# Patient Record
Sex: MALE | Race: WHITE | NOT HISPANIC OR LATINO | Employment: FULL TIME | URBAN - NONMETROPOLITAN AREA
[De-identification: names, ages, dates, MRNs, and addresses within clinical notes are randomized per-mention and may not be internally consistent; named-entity substitution may affect disease eponyms.]

---

## 2021-05-30 ENCOUNTER — OFFICE VISIT (OUTPATIENT)
Dept: FAMILY MEDICINE | Facility: CLINIC | Age: 41
End: 2021-05-30

## 2021-05-30 VITALS
HEIGHT: 73 IN | WEIGHT: 228 LBS | DIASTOLIC BLOOD PRESSURE: 90 MMHG | BODY MASS INDEX: 30.22 KG/M2 | SYSTOLIC BLOOD PRESSURE: 126 MMHG | OXYGEN SATURATION: 97 % | TEMPERATURE: 98 F | HEART RATE: 84 BPM

## 2021-05-30 DIAGNOSIS — J32.9 SINUSITIS, UNSPECIFIED CHRONICITY, UNSPECIFIED LOCATION: Primary | ICD-10-CM

## 2021-05-30 PROCEDURE — 96372 THER/PROPH/DIAG INJ SC/IM: CPT | Mod: ,,, | Performed by: NURSE PRACTITIONER

## 2021-05-30 PROCEDURE — 99051 PR MEDICAL SERVICES, EVE/WKEND/HOLIDAY: ICD-10-PCS | Mod: ,,, | Performed by: NURSE PRACTITIONER

## 2021-05-30 PROCEDURE — 99203 OFFICE O/P NEW LOW 30 MIN: CPT | Mod: 25,,, | Performed by: NURSE PRACTITIONER

## 2021-05-30 PROCEDURE — 99203 PR OFFICE/OUTPT VISIT, NEW, LEVL III, 30-44 MIN: ICD-10-PCS | Mod: 25,,, | Performed by: NURSE PRACTITIONER

## 2021-05-30 PROCEDURE — 99051 MED SERV EVE/WKEND/HOLIDAY: CPT | Mod: ,,, | Performed by: NURSE PRACTITIONER

## 2021-05-30 PROCEDURE — 96372 PR INJECTION,THERAP/PROPH/DIAG2ST, IM OR SUBCUT: ICD-10-PCS | Mod: ,,, | Performed by: NURSE PRACTITIONER

## 2021-05-30 RX ORDER — METHYLPREDNISOLONE ACETATE 40 MG/ML
40 INJECTION, SUSPENSION INTRA-ARTICULAR; INTRALESIONAL; INTRAMUSCULAR; SOFT TISSUE ONCE
Status: COMPLETED | OUTPATIENT
Start: 2021-05-30 | End: 2021-05-30

## 2021-05-30 RX ORDER — AMOXICILLIN AND CLAVULANATE POTASSIUM 875; 125 MG/1; MG/1
1 TABLET, FILM COATED ORAL 2 TIMES DAILY
Qty: 14 TABLET | Refills: 0 | Status: SHIPPED | OUTPATIENT
Start: 2021-05-30 | End: 2021-06-06

## 2021-05-30 RX ORDER — DEXAMETHASONE SODIUM PHOSPHATE 4 MG/ML
4 INJECTION, SOLUTION INTRA-ARTICULAR; INTRALESIONAL; INTRAMUSCULAR; INTRAVENOUS; SOFT TISSUE
Status: COMPLETED | OUTPATIENT
Start: 2021-05-30 | End: 2021-05-30

## 2021-05-30 RX ADMIN — METHYLPREDNISOLONE ACETATE 40 MG: 40 INJECTION, SUSPENSION INTRA-ARTICULAR; INTRALESIONAL; INTRAMUSCULAR; SOFT TISSUE at 10:05

## 2021-05-30 RX ADMIN — DEXAMETHASONE SODIUM PHOSPHATE 4 MG: 4 INJECTION, SOLUTION INTRA-ARTICULAR; INTRALESIONAL; INTRAMUSCULAR; INTRAVENOUS; SOFT TISSUE at 10:05

## 2021-11-30 ENCOUNTER — OFFICE VISIT (OUTPATIENT)
Dept: PRIMARY CARE CLINIC | Facility: CLINIC | Age: 41
End: 2021-11-30

## 2021-11-30 VITALS
RESPIRATION RATE: 17 BRPM | HEART RATE: 74 BPM | DIASTOLIC BLOOD PRESSURE: 88 MMHG | WEIGHT: 239 LBS | TEMPERATURE: 98 F | BODY MASS INDEX: 31.68 KG/M2 | OXYGEN SATURATION: 99 % | HEIGHT: 73 IN | SYSTOLIC BLOOD PRESSURE: 136 MMHG

## 2021-11-30 DIAGNOSIS — H66.001 NON-RECURRENT ACUTE SUPPURATIVE OTITIS MEDIA OF RIGHT EAR WITHOUT SPONTANEOUS RUPTURE OF TYMPANIC MEMBRANE: Primary | ICD-10-CM

## 2021-11-30 DIAGNOSIS — J06.9 ACUTE UPPER RESPIRATORY INFECTION: ICD-10-CM

## 2021-11-30 PROCEDURE — 96372 PR INJECTION,THERAP/PROPH/DIAG2ST, IM OR SUBCUT: ICD-10-PCS | Mod: ,,, | Performed by: NURSE PRACTITIONER

## 2021-11-30 PROCEDURE — 99203 OFFICE O/P NEW LOW 30 MIN: CPT | Mod: 25,,, | Performed by: NURSE PRACTITIONER

## 2021-11-30 PROCEDURE — 99203 PR OFFICE/OUTPT VISIT, NEW, LEVL III, 30-44 MIN: ICD-10-PCS | Mod: 25,,, | Performed by: NURSE PRACTITIONER

## 2021-11-30 PROCEDURE — 96372 THER/PROPH/DIAG INJ SC/IM: CPT | Mod: ,,, | Performed by: NURSE PRACTITIONER

## 2021-11-30 RX ORDER — CLONAZEPAM 0.5 MG/1
TABLET ORAL
COMMUNITY
Start: 2021-11-18

## 2021-11-30 RX ORDER — AMOXICILLIN AND CLAVULANATE POTASSIUM 875; 125 MG/1; MG/1
1 TABLET, FILM COATED ORAL EVERY 12 HOURS
Qty: 20 TABLET | Refills: 0 | Status: SHIPPED | OUTPATIENT
Start: 2021-11-30 | End: 2023-01-22

## 2021-11-30 RX ORDER — METHYLPREDNISOLONE ACETATE 40 MG/ML
40 INJECTION, SUSPENSION INTRA-ARTICULAR; INTRALESIONAL; INTRAMUSCULAR; SOFT TISSUE
Status: COMPLETED | OUTPATIENT
Start: 2021-11-30 | End: 2021-11-30

## 2021-11-30 RX ORDER — DEXAMETHASONE SODIUM PHOSPHATE 4 MG/ML
6 INJECTION, SOLUTION INTRA-ARTICULAR; INTRALESIONAL; INTRAMUSCULAR; INTRAVENOUS; SOFT TISSUE
Status: COMPLETED | OUTPATIENT
Start: 2021-11-30 | End: 2021-11-30

## 2021-11-30 RX ORDER — CEFTRIAXONE 1 G/1
1 INJECTION, POWDER, FOR SOLUTION INTRAMUSCULAR; INTRAVENOUS
Status: COMPLETED | OUTPATIENT
Start: 2021-11-30 | End: 2021-11-30

## 2021-11-30 RX ADMIN — CEFTRIAXONE 1 G: 1 INJECTION, POWDER, FOR SOLUTION INTRAMUSCULAR; INTRAVENOUS at 09:11

## 2021-11-30 RX ADMIN — METHYLPREDNISOLONE ACETATE 40 MG: 40 INJECTION, SUSPENSION INTRA-ARTICULAR; INTRALESIONAL; INTRAMUSCULAR; SOFT TISSUE at 10:11

## 2021-11-30 RX ADMIN — DEXAMETHASONE SODIUM PHOSPHATE 6 MG: 4 INJECTION, SOLUTION INTRA-ARTICULAR; INTRALESIONAL; INTRAMUSCULAR; INTRAVENOUS; SOFT TISSUE at 10:11

## 2023-01-22 ENCOUNTER — OFFICE VISIT (OUTPATIENT)
Dept: FAMILY MEDICINE | Facility: CLINIC | Age: 43
End: 2023-01-22

## 2023-01-22 VITALS
DIASTOLIC BLOOD PRESSURE: 86 MMHG | TEMPERATURE: 102 F | OXYGEN SATURATION: 99 % | SYSTOLIC BLOOD PRESSURE: 120 MMHG | WEIGHT: 216 LBS | HEIGHT: 73 IN | BODY MASS INDEX: 28.63 KG/M2 | HEART RATE: 125 BPM

## 2023-01-22 DIAGNOSIS — Z20.822 CONTACT WITH AND (SUSPECTED) EXPOSURE TO COVID-19: ICD-10-CM

## 2023-01-22 DIAGNOSIS — J02.9 SORE THROAT: ICD-10-CM

## 2023-01-22 DIAGNOSIS — R50.9 FEVER IN ADULT: ICD-10-CM

## 2023-01-22 DIAGNOSIS — B95.0 STREPTOCOCCAL INFECTION GROUP A: Primary | ICD-10-CM

## 2023-01-22 LAB
CTP QC/QA: YES
CTP QC/QA: YES
FLUAV AG NPH QL: NEGATIVE
FLUBV AG NPH QL: NEGATIVE
S PYO RRNA THROAT QL PROBE: POSITIVE
SARS-COV-2 AG RESP QL IA.RAPID: NEGATIVE

## 2023-01-22 PROCEDURE — 99213 OFFICE O/P EST LOW 20 MIN: CPT | Mod: 25,,, | Performed by: NURSE PRACTITIONER

## 2023-01-22 PROCEDURE — 87428 POCT SARS-COV2 (COVID) WITH FLU ANTIGEN: ICD-10-PCS | Mod: QW,,, | Performed by: NURSE PRACTITIONER

## 2023-01-22 PROCEDURE — 96372 PR INJECTION,THERAP/PROPH/DIAG2ST, IM OR SUBCUT: ICD-10-PCS | Mod: ,,, | Performed by: NURSE PRACTITIONER

## 2023-01-22 PROCEDURE — 87428 SARSCOV & INF VIR A&B AG IA: CPT | Mod: QW,,, | Performed by: NURSE PRACTITIONER

## 2023-01-22 PROCEDURE — 99213 PR OFFICE/OUTPT VISIT, EST, LEVL III, 20-29 MIN: ICD-10-PCS | Mod: 25,,, | Performed by: NURSE PRACTITIONER

## 2023-01-22 PROCEDURE — 96372 THER/PROPH/DIAG INJ SC/IM: CPT | Mod: ,,, | Performed by: NURSE PRACTITIONER

## 2023-01-22 PROCEDURE — 99051 MED SERV EVE/WKEND/HOLIDAY: CPT | Mod: ,,, | Performed by: NURSE PRACTITIONER

## 2023-01-22 PROCEDURE — 87880 POCT RAPID STREP A: ICD-10-PCS | Mod: QW,,, | Performed by: NURSE PRACTITIONER

## 2023-01-22 PROCEDURE — 99051 PR MEDICAL SERVICES, EVE/WKEND/HOLIDAY: ICD-10-PCS | Mod: ,,, | Performed by: NURSE PRACTITIONER

## 2023-01-22 PROCEDURE — 87880 STREP A ASSAY W/OPTIC: CPT | Mod: QW,,, | Performed by: NURSE PRACTITIONER

## 2023-01-22 RX ORDER — ACETAMINOPHEN 325 MG/1
650 TABLET ORAL
Status: COMPLETED | OUTPATIENT
Start: 2023-01-22 | End: 2023-01-22

## 2023-01-22 RX ORDER — BUPROPION HYDROCHLORIDE 300 MG/1
300 TABLET ORAL EVERY MORNING
COMMUNITY
Start: 2022-10-11

## 2023-01-22 RX ORDER — DEXTROAMPHETAMINE SACCHARATE, AMPHETAMINE ASPARTATE, DEXTROAMPHETAMINE SULFATE AND AMPHETAMINE SULFATE 5; 5; 5; 5 MG/1; MG/1; MG/1; MG/1
0.5 TABLET ORAL 2 TIMES DAILY
COMMUNITY
Start: 2023-01-13

## 2023-01-22 RX ADMIN — ACETAMINOPHEN 650 MG: 325 TABLET ORAL at 11:01

## 2023-01-22 NOTE — LETTER
January 22, 2023    Parker Watson  [address]  [place] [address]             [facility] [address] [facility]  Family Medicine  [facility] [address] [facility] [place] [address]  Phone: [phone]  Fax: [phone]   January 22, 2023     Patient: Parker Watson   YOB: 1980   Date of Visit: 1/22/2023       To Whom it May Concern:    Parker Watson was seen in my clinic on 1/22/2023. He may return to work on 1/24/23 .    Please excuse him from any classes or work missed.    If you have any questions or concerns, please don't hesitate to call.    Sincerely,         LEI Galo

## 2023-01-22 NOTE — PROGRESS NOTES
[facility]    Chief Complaint      Chief Complaint   Patient presents with    Fever    Generalized Body Aches    Sore Throat    Cough    Headache    Fatigue    Chills    Nausea    Emesis    Documentation Only     Symptoms began yesterday        History of Present Illness      Parker Watson is a 42 y.o. male. He  has a past medical history of Allergy and Anxiety., who presents today for URI type symptoms since yesterday.    Past Medical History:  Past Medical History:   Diagnosis Date    Allergy     Anxiety        Past Surgical History:   has a past surgical history that includes Appendectomy.    Social History:  Social History     Tobacco Use    Smoking status: Some Days     Types: Cigarettes    Smokeless tobacco: Never    Tobacco comments:     was 1 pk a week; now smokes approx a pk over 1-2 months   Substance Use Topics    Alcohol use: Yes     Comment: social    Drug use: Never       I personally reviewed all past medical, surgical, and social.     Review of Systems   Constitutional:  Positive for chills, fatigue and fever.   HENT:  Positive for sore throat. Negative for rhinorrhea and sneezing.    Respiratory:  Positive for cough. Negative for shortness of breath.    Gastrointestinal:  Positive for nausea and vomiting. Negative for diarrhea.   Musculoskeletal:  Positive for myalgias.   Skin:  Negative for rash.   Neurological:  Positive for headaches.      Medications:  Outpatient Encounter Medications as of 1/22/2023   Medication Sig Dispense Refill    buPROPion (WELLBUTRIN XL) 300 MG 24 hr tablet Take 300 mg by mouth every morning.      dextroamphetamine-amphetamine (ADDERALL) 20 mg tablet Take 0.5 tablets by mouth 2 (two) times daily.      clonazePAM (KLONOPIN) 0.5 MG tablet TAKE 1 TABLET BY MOUTH TWICE DAILY AS NEEDED FOR ANXIETY (MAY TAKE 2 FOR EXTREME ANXIETY) CAUSES DROWSINESS-AVOID ALCOHOL!!      [DISCONTINUED] amoxicillin-clavulanate 875-125mg (AUGMENTIN) 875-125 mg per tablet Take 1 tablet  "by mouth every 12 (twelve) hours. (Patient not taking: Reported on 1/22/2023) 20 tablet 0     Facility-Administered Encounter Medications as of 1/22/2023   Medication Dose Route Frequency Provider Last Rate Last Admin    [COMPLETED] acetaminophen tablet 650 mg  650 mg Oral 1 time in Clinic/HOD LEI Galo   650 mg at 01/22/23 1148    [COMPLETED] penicillin G benzathine (BICILLIN LA) injection 1.2 Million Units  1.2 Million Units Intramuscular Once Jolly Zamarripa LEI   1.2 Million Units at 01/22/23 1309       Allergies:  Review of patient's allergies indicates:  No Known Allergies    Health Maintenance:  There is no immunization history for the selected administration types on file for this patient.   Health Maintenance   Topic Date Due    Hepatitis C Screening  Never done    Lipid Panel  Never done    TETANUS VACCINE  Never done        Physical Exam      Vital Signs  Temp: (!) 102.1 °F (38.9 °C)  Temp src: Oral  Pulse: (!) 125  SpO2: 99 %  BP: 120/86  Height and Weight  Height: 6' 1" (185.4 cm)  Weight: 98 kg (216 lb)  BSA (Calculated - sq m): 2.25 sq meters  BMI (Calculated): 28.5  Weight in (lb) to have BMI = 25: 189.1]    Physical Exam  Vitals and nursing note reviewed.   Constitutional:       General: He is not in acute distress.     Appearance: He is ill-appearing.   HENT:      Head: Normocephalic.      Right Ear: Hearing normal.      Left Ear: Hearing normal.      Nose: Nose normal.      Mouth/Throat:      Lips: Pink.      Mouth: Mucous membranes are dry.      Pharynx: Pharyngeal swelling and posterior oropharyngeal erythema present. No oropharyngeal exudate.   Eyes:      General: Lids are normal.      Conjunctiva/sclera: Conjunctivae normal.   Neck:      Thyroid: No thyromegaly.   Cardiovascular:      Rate and Rhythm: Regular rhythm. Tachycardia present.      Heart sounds: Normal heart sounds.   Pulmonary:      Effort: Pulmonary effort is normal.      Breath sounds: Normal breath sounds. "   Musculoskeletal:         General: Normal range of motion.      Cervical back: Normal range of motion and neck supple.   Skin:     General: Skin is warm and dry.      Findings: No rash.   Neurological:      General: No focal deficit present.      Mental Status: He is alert and oriented to person, place, and time.      Gait: Gait is intact.        Laboratory:  CBC:      CMP:        Invalid input(s): CREATININ  LIPIDS:      TSH:      A1C:        Assessment/Plan     Parker Watson is a 42 y.o.male with:     1. Contact with and (suspected) exposure to covid-19  - POCT SARS-COV2 (COVID) with Flu Antigen    2. Sore throat  - POCT rapid strep A    3. Fever in adult  - acetaminophen tablet 650 mg    4. Streptococcal infection group A  - penicillin G benzathine (BICILLIN LA) injection 1.2 Million Units       Total time spent face-to-face and non-face-to-face coordinating care for this encounter was: 20 minutes     Chronic conditions status updated as per HPI.  Other than changes above, cont current medications and maintain follow up with specialists.  Return to clinic prn if symptoms worsen or fail to improve.    Jolly Zamarripa, FNP  [facility]

## 2024-03-17 ENCOUNTER — NURSE TRIAGE (OUTPATIENT)
Dept: ADMINISTRATIVE | Facility: CLINIC | Age: 44
End: 2024-03-17

## 2024-03-17 NOTE — TELEPHONE ENCOUNTER
Pt with complaints of right thigh pain after falling yesterday, slipped on a wet step.  Pt is able to bear weight, +swelling/+bruising noted.   Care advice states to see a provider within 24 hours.  [facility] offered and accepted.  Appointment - Virtual Visit made for 7am tomorrow morning.  Patient verbally understands, all questions answered, advised to call back for any worsening symptoms or further needs.   Pt is new to [place] and has not set up with a pcp as of yet.     Reason for Disposition   [1] Large swelling or bruise (> 2 inches or 5 cm) AND [2] able to bear weight    Additional Information   Negative: Serious injury with multiple fractures (broken bones)   Negative: [1] Major bleeding (e.g., actively dripping or spurting) AND [2] can't be stopped   Negative: Bullet wound, stabbed by knife, or other serious penetrating wound   Negative: Looks like a dislocated joint (crooked or deformed)   Negative: Can't stand (bear weight) or walk   Negative: Sounds like a life-threatening emergency to the triager   Negative: Skin is split open or gaping (or length > 1/2 inch or 12 mm)   Negative: [1] Bleeding AND [2] won't stop after 10 minutes of direct pressure (using correct technique)   Negative: [1] Dirt in the wound AND [2] not removed with 15 minutes of scrubbing   Negative: Sounds like a serious injury to the triager   Negative: [1] SEVERE pain AND [2] not improved 2 hours after pain medicine/ice packs   Negative: Suspicious history for the injury    Protocols used: Hip Injury-A-